# Patient Record
Sex: MALE | Race: WHITE
[De-identification: names, ages, dates, MRNs, and addresses within clinical notes are randomized per-mention and may not be internally consistent; named-entity substitution may affect disease eponyms.]

---

## 2018-08-15 ENCOUNTER — HOSPITAL ENCOUNTER (OUTPATIENT)
Dept: HOSPITAL 47 - RADUSWWP | Age: 73
Discharge: HOME | End: 2018-08-15
Attending: FAMILY MEDICINE
Payer: MEDICARE

## 2018-08-15 DIAGNOSIS — R60.0: Primary | ICD-10-CM

## 2018-08-15 NOTE — US
EXAMINATION TYPE: US venous doppler duplex LE RT

 

DATE OF EXAM: 8/15/2018 7:11 AM

 

COMPARISON: NONE

 

CLINICAL HISTORY: Rt leg edema R60.0. Pt states right leg swelling x 3 weeks/ No known prior DVT

 

SIDE PERFORMED: Right  

 

TECHNIQUE:  The lower extremity deep venous system is examined utilizing real time linear array sonog
jimmy with graded compression, doppler sonography and color-flow sonography.

 

VESSELS IMAGED:

External Iliac Vein (EIV)

Common Femoral Vein

Deep Femoral Vein

Greater Saphenous Vein *

Femoral Vein

Popliteal Vein

Small Saphenous Vein *

Proximal Calf Veins

(* superficial vessels)

 

 

 

Right Leg:  Negative for DVT, probable complex Baker's cyst right pop fossa= 6.7 x 1.3 x 4.7 cm

 

 

 Grayscale, color doppler, spectral doppler imaging performed of the deep veins of the right lower ex
tremity.  There is normal flow, compressibility, vascular waveforms.

Moderate to large size popliteal cyst marked towards end of study.

 

IMPRESSION: No ultrasound evidence for acute DVT in the right lower extremity.

## 2020-08-28 ENCOUNTER — HOSPITAL ENCOUNTER (INPATIENT)
Dept: HOSPITAL 47 - EC | Age: 75
LOS: 2 days | Discharge: HOME | DRG: 853 | End: 2020-08-30
Payer: MEDICARE

## 2020-08-28 DIAGNOSIS — K56.7: ICD-10-CM

## 2020-08-28 DIAGNOSIS — R33.9: ICD-10-CM

## 2020-08-28 DIAGNOSIS — Z98.890: ICD-10-CM

## 2020-08-28 DIAGNOSIS — K40.91: ICD-10-CM

## 2020-08-28 DIAGNOSIS — K35.33: ICD-10-CM

## 2020-08-28 DIAGNOSIS — E87.1: ICD-10-CM

## 2020-08-28 DIAGNOSIS — Z82.49: ICD-10-CM

## 2020-08-28 DIAGNOSIS — J44.9: ICD-10-CM

## 2020-08-28 DIAGNOSIS — Z87.891: ICD-10-CM

## 2020-08-28 DIAGNOSIS — Z88.8: ICD-10-CM

## 2020-08-28 DIAGNOSIS — A41.9: Primary | ICD-10-CM

## 2020-08-28 DIAGNOSIS — Z80.0: ICD-10-CM

## 2020-08-28 DIAGNOSIS — K59.00: ICD-10-CM

## 2020-08-28 LAB
ALBUMIN SERPL-MCNC: 4 G/DL (ref 3.5–5)
ALP SERPL-CCNC: 65 U/L (ref 38–126)
ALT SERPL-CCNC: 17 U/L (ref 4–49)
AMYLASE SERPL-CCNC: 53 U/L (ref 30–110)
ANION GAP SERPL CALC-SCNC: 7 MMOL/L
AST SERPL-CCNC: 26 U/L (ref 17–59)
BASOPHILS # BLD AUTO: 0 K/UL (ref 0–0.2)
BASOPHILS NFR BLD AUTO: 0 %
BUN SERPL-SCNC: 10 MG/DL (ref 9–20)
CALCIUM SPEC-MCNC: 8.8 MG/DL (ref 8.4–10.2)
CHLORIDE SERPL-SCNC: 102 MMOL/L (ref 98–107)
CO2 SERPL-SCNC: 23 MMOL/L (ref 22–30)
EOSINOPHIL # BLD AUTO: 0.3 K/UL (ref 0–0.7)
EOSINOPHIL NFR BLD AUTO: 2 %
ERYTHROCYTE [DISTWIDTH] IN BLOOD BY AUTOMATED COUNT: 5.21 M/UL (ref 4.3–5.9)
ERYTHROCYTE [DISTWIDTH] IN BLOOD: 13.7 % (ref 11.5–15.5)
GLUCOSE SERPL-MCNC: 130 MG/DL (ref 74–99)
HCT VFR BLD AUTO: 46.3 % (ref 39–53)
HGB BLD-MCNC: 15 GM/DL (ref 13–17.5)
HYALINE CASTS UR QL AUTO: 1 /LPF (ref 0–2)
KETONES UR QL STRIP.AUTO: (no result)
LYMPHOCYTES # SPEC AUTO: 0.9 K/UL (ref 1–4.8)
LYMPHOCYTES NFR SPEC AUTO: 6 %
MCH RBC QN AUTO: 28.8 PG (ref 25–35)
MCHC RBC AUTO-ENTMCNC: 32.4 G/DL (ref 31–37)
MCV RBC AUTO: 88.9 FL (ref 80–100)
MONOCYTES # BLD AUTO: 0.7 K/UL (ref 0–1)
MONOCYTES NFR BLD AUTO: 4 %
NEUTROPHILS # BLD AUTO: 14.8 K/UL (ref 1.3–7.7)
NEUTROPHILS NFR BLD AUTO: 88 %
PH UR: 6 [PH] (ref 5–8)
PLATELET # BLD AUTO: 194 K/UL (ref 150–450)
POTASSIUM SERPL-SCNC: 4.2 MMOL/L (ref 3.5–5.1)
PROT SERPL-MCNC: 6.9 G/DL (ref 6.3–8.2)
PROT UR QL: (no result)
RBC UR QL: 3 /HPF (ref 0–5)
SODIUM SERPL-SCNC: 132 MMOL/L (ref 137–145)
SP GR UR: 1.02 (ref 1–1.03)
UROBILINOGEN UR QL STRIP: <2 MG/DL (ref ?–2)
WBC # BLD AUTO: 16.9 K/UL (ref 3.8–10.6)
WBC #/AREA URNS HPF: 1 /HPF (ref 0–5)

## 2020-08-28 PROCEDURE — 96376 TX/PRO/DX INJ SAME DRUG ADON: CPT

## 2020-08-28 PROCEDURE — 93005 ELECTROCARDIOGRAM TRACING: CPT

## 2020-08-28 PROCEDURE — 83690 ASSAY OF LIPASE: CPT

## 2020-08-28 PROCEDURE — 0DTJ4ZZ RESECTION OF APPENDIX, PERCUTANEOUS ENDOSCOPIC APPROACH: ICD-10-PCS

## 2020-08-28 PROCEDURE — 85025 COMPLETE CBC W/AUTO DIFF WBC: CPT

## 2020-08-28 PROCEDURE — 87186 SC STD MICRODIL/AGAR DIL: CPT

## 2020-08-28 PROCEDURE — 8E0W4CZ ROBOTIC ASSISTED PROCEDURE OF TRUNK REGION, PERCUTANEOUS ENDOSCOPIC APPROACH: ICD-10-PCS

## 2020-08-28 PROCEDURE — 74019 RADEX ABDOMEN 2 VIEWS: CPT

## 2020-08-28 PROCEDURE — 3E1M38Z IRRIGATION OF PERITONEAL CAVITY USING IRRIGATING SUBSTANCE, PERCUTANEOUS APPROACH: ICD-10-PCS

## 2020-08-28 PROCEDURE — 88304 TISSUE EXAM BY PATHOLOGIST: CPT

## 2020-08-28 PROCEDURE — 96375 TX/PRO/DX INJ NEW DRUG ADDON: CPT

## 2020-08-28 PROCEDURE — 87205 SMEAR GRAM STAIN: CPT

## 2020-08-28 PROCEDURE — 87077 CULTURE AEROBIC IDENTIFY: CPT

## 2020-08-28 PROCEDURE — 87070 CULTURE OTHR SPECIMN AEROBIC: CPT

## 2020-08-28 PROCEDURE — 99285 EMERGENCY DEPT VISIT HI MDM: CPT

## 2020-08-28 PROCEDURE — 83605 ASSAY OF LACTIC ACID: CPT

## 2020-08-28 PROCEDURE — 82150 ASSAY OF AMYLASE: CPT

## 2020-08-28 PROCEDURE — 87040 BLOOD CULTURE FOR BACTERIA: CPT

## 2020-08-28 PROCEDURE — 96365 THER/PROPH/DIAG IV INF INIT: CPT

## 2020-08-28 PROCEDURE — 71046 X-RAY EXAM CHEST 2 VIEWS: CPT

## 2020-08-28 PROCEDURE — 80053 COMPREHEN METABOLIC PANEL: CPT

## 2020-08-28 PROCEDURE — 74177 CT ABD & PELVIS W/CONTRAST: CPT

## 2020-08-28 PROCEDURE — 36415 COLL VENOUS BLD VENIPUNCTURE: CPT

## 2020-08-28 PROCEDURE — 80048 BASIC METABOLIC PNL TOTAL CA: CPT

## 2020-08-28 PROCEDURE — 96361 HYDRATE IV INFUSION ADD-ON: CPT

## 2020-08-28 PROCEDURE — 87075 CULTR BACTERIA EXCEPT BLOOD: CPT

## 2020-08-28 PROCEDURE — 81001 URINALYSIS AUTO W/SCOPE: CPT

## 2020-08-28 RX ADMIN — NICARDIPINE HYDROCHLORIDE SCH MLS/HR: 2.5 INJECTION INTRAVENOUS at 09:40

## 2020-08-28 RX ADMIN — PIPERACILLIN AND TAZOBACTAM SCH MLS/HR: 3; .375 INJECTION, POWDER, FOR SOLUTION INTRAVENOUS at 20:04

## 2020-08-28 RX ADMIN — KETOROLAC TROMETHAMINE SCH MG: 15 INJECTION, SOLUTION INTRAMUSCULAR; INTRAVENOUS at 11:23

## 2020-08-28 RX ADMIN — KETOROLAC TROMETHAMINE SCH MG: 15 INJECTION, SOLUTION INTRAMUSCULAR; INTRAVENOUS at 23:02

## 2020-08-28 RX ADMIN — ACETAMINOPHEN SCH MG: 500 TABLET ORAL at 23:02

## 2020-08-28 RX ADMIN — ACETAMINOPHEN SCH: 500 TABLET ORAL at 11:17

## 2020-08-28 RX ADMIN — ACETAMINOPHEN SCH MG: 500 TABLET ORAL at 09:06

## 2020-08-28 RX ADMIN — ACETAMINOPHEN SCH: 500 TABLET ORAL at 18:11

## 2020-08-28 RX ADMIN — KETOROLAC TROMETHAMINE SCH: 15 INJECTION, SOLUTION INTRAMUSCULAR; INTRAVENOUS at 19:49

## 2020-08-28 RX ADMIN — PANTOPRAZOLE SODIUM SCH MG: 40 INJECTION, POWDER, FOR SOLUTION INTRAVENOUS at 09:07

## 2020-08-28 RX ADMIN — HEPARIN SODIUM SCH UNIT: 5000 INJECTION, SOLUTION INTRAVENOUS; SUBCUTANEOUS at 09:07

## 2020-08-28 RX ADMIN — HEPARIN SODIUM SCH UNIT: 5000 INJECTION, SOLUTION INTRAVENOUS; SUBCUTANEOUS at 20:04

## 2020-08-28 NOTE — XR
EXAMINATION TYPE: XR chest 2V

 

DATE OF EXAM: 8/28/2020

 

COMPARISON: NONE

 

HISTORY: Shortness of breath

 

TECHNIQUE:  Frontal and lateral views of the chest are obtained.

 

FINDINGS:

 

Scattered senescent parenchymal changes noted. Hyperinflation compatible with COPD. 

 

No evidence for infiltrate. No evidence for atelectasis.

 

Heart size is stable.

 

Mediastinal structures are stable and grossly unremarkable.

 

No evidence for hilar prominence.

 

Degenerative changes dorsal spine. 

 

IMPRESSION:

1. No evidence for acute pulmonary disease.

## 2020-08-28 NOTE — ED
Abdominal Pain HPI





- General


Chief Complaint: Abdominal Pain


Stated Complaint: Abdominal pain


Time Seen by Provider: 08/28/20 03:53


Source: patient, family, RN notes reviewed, old records reviewed


Mode of arrival: ambulatory


Limitations: no limitations





- History of Present Illness


Initial Comments: 





This is a 74-year-old male DF for evaluation patient Dese for 3 days of 

constipation has history of hernia repair.  Patient's pain is here no fevers.  

He denies any and nausea no vomiting.  Appetite is been diminished.  Patient has

persistent abdominal pain with persistent constipation unable to have 

significant bowel movement again 3 days.  Pain is severe mainly located right 

lower quadrant believes he may have eaten that


MD Complaint: abdominal pain (Right lower quadrant)


-: days(s) (3)


Location: RLQ


Radiation: RLQ


Migration to: RLQ


Severity: severe


Severity scale (1-10): 10


Quality: stabbing, aching


Consistency: constant


Improves With: nothing


Worsens With: nothing


Associated Symptoms: nausea, constipation





- Related Data


                                    Allergies











Allergy/AdvReac Type Severity Reaction Status Date / Time


 


prednisone Allergy  Unknown Verified 08/28/20 03:51














Review of Systems


ROS Statement: 


Those systems with pertinent positive or pertinent negative responses have been 

documented in the HPI.





ROS Other: All systems not noted in ROS Statement are negative.





Past Medical History


Past Medical History: No Reported History


History of Any Multi-Drug Resistant Organisms: None Reported


Past Surgical History: Hernia Repair


Smoking Status: Former smoker


Past Alcohol Use History: Occasional


Past Drug Use History: None Reported





General Exam


Limitations: no limitations


General appearance: alert, in no apparent distress


Head exam: Present: atraumatic, normocephalic, normal inspection


Eye exam: Present: normal appearance, PERRL, EOMI.  Absent: scleral icterus, 

conjunctival injection, periorbital swelling


ENT exam: Present: normal exam, mucous membranes moist


Neck exam: Present: normal inspection.  Absent: tenderness, meningismus, 

lymphadenopathy


Respiratory exam: Present: normal lung sounds bilaterally.  Absent: respiratory 

distress, wheezes, rales, rhonchi, stridor


Cardiovascular Exam: Present: regular rate, normal rhythm, normal heart sounds. 

Absent: systolic murmur, diastolic murmur, rubs, gallop, clicks


GI/Abdominal exam: Present: distended (Right lower quadrant), tenderness, 

guarding, normal bowel sounds.  Absent: soft, rebound, rigid


Extremities exam: Present: normal inspection, full ROM, normal capillary refill.

 Absent: tenderness, pedal edema, joint swelling, calf tenderness


Back exam: Present: normal inspection


Neurological exam: Present: alert, oriented X3, CN II-XII intact


Psychiatric exam: Present: normal affect, normal mood


Skin exam: Present: warm, dry, intact, normal color.  Absent: rash





Course


                                   Vital Signs











  08/28/20 08/28/20





  03:47 05:52


 


Temperature 99.2 F 


 


Pulse Rate 94 76


 


Respiratory 20 20





Rate  


 


Blood Pressure 145/84 141/66


 


O2 Sat by Pulse 97 95





Oximetry  














- Reevaluation(s)


Reevaluation #1: 





08/28/20 06:21


Medical records reviewed


Reevaluation #2: 





08/28/20 06:22


Patient has good pain control


Reevaluation #3: 





08/28/20 06:22


Spoke with patient and wife regarding symptoms, and findings questions answered





- Consultations


Consultation #1: 





Spoke with Dr. Bennett will keep patient in the operating room before or





Medical Decision Making





- Medical Decision Making





34 male DF for evaluation of severe abdominal pain.  Patient will be admitted to

the operating room for surgical evaluation and treatment





- Lab Data


Result diagrams: 


                                 08/28/20 04:31





                                 08/28/20 04:31


                                   Lab Results











  08/28/20 08/28/20 08/28/20 Range/Units





  04:31 04:31 04:31 


 


WBC  16.9 H    (3.8-10.6)  k/uL


 


RBC  5.21    (4.30-5.90)  m/uL


 


Hgb  15.0    (13.0-17.5)  gm/dL


 


Hct  46.3    (39.0-53.0)  %


 


MCV  88.9    (80.0-100.0)  fL


 


MCH  28.8    (25.0-35.0)  pg


 


MCHC  32.4    (31.0-37.0)  g/dL


 


RDW  13.7    (11.5-15.5)  %


 


Plt Count  194    (150-450)  k/uL


 


Neutrophils %  88    %


 


Lymphocytes %  6    %


 


Monocytes %  4    %


 


Eosinophils %  2    %


 


Basophils %  0    %


 


Neutrophils #  14.8 H    (1.3-7.7)  k/uL


 


Lymphocytes #  0.9 L    (1.0-4.8)  k/uL


 


Monocytes #  0.7    (0-1.0)  k/uL


 


Eosinophils #  0.3    (0-0.7)  k/uL


 


Basophils #  0.0    (0-0.2)  k/uL


 


Sodium    132 L  (137-145)  mmol/L


 


Potassium    4.2  (3.5-5.1)  mmol/L


 


Chloride    102  ()  mmol/L


 


Carbon Dioxide    23  (22-30)  mmol/L


 


Anion Gap    7  mmol/L


 


BUN    10  (9-20)  mg/dL


 


Creatinine    0.85  (0.66-1.25)  mg/dL


 


Est GFR (CKD-EPI)AfAm    >90  (>60 ml/min/1.73 sqM)  


 


Est GFR (CKD-EPI)NonAf    85  (>60 ml/min/1.73 sqM)  


 


Glucose    130 H  (74-99)  mg/dL


 


Plasma Lactic Acid Christopher     (0.7-2.0)  mmol/L


 


Calcium    8.8  (8.4-10.2)  mg/dL


 


Total Bilirubin    1.5 H  (0.2-1.3)  mg/dL


 


AST    26  (17-59)  U/L


 


ALT    17  (4-49)  U/L


 


Alkaline Phosphatase    65  ()  U/L


 


Total Protein    6.9  (6.3-8.2)  g/dL


 


Albumin    4.0  (3.5-5.0)  g/dL


 


Amylase    53  ()  U/L


 


Lipase    55  ()  U/L


 


Urine Color   Yellow   


 


Urine Appearance   Clear   (Clear)  


 


Urine pH   6.0   (5.0-8.0)  


 


Ur Specific Gravity   1.016   (1.001-1.035)  


 


Urine Protein   1+ H   (Negative)  


 


Urine Glucose (UA)   Negative   (Negative)  


 


Urine Ketones   2+ H   (Negative)  


 


Urine Blood   Trace H   (Negative)  


 


Urine Nitrite   Negative   (Negative)  


 


Urine Bilirubin   Negative   (Negative)  


 


Urine Urobilinogen   <2.0   (<2.0)  mg/dL


 


Ur Leukocyte Esterase   Negative   (Negative)  


 


Urine RBC   3   (0-5)  /hpf


 


Urine WBC   1   (0-5)  /hpf


 


Hyaline Casts   1   (0-2)  /lpf


 


Urine Mucus   Few H   (None)  /hpf














  08/28/20 Range/Units





  04:31 


 


WBC   (3.8-10.6)  k/uL


 


RBC   (4.30-5.90)  m/uL


 


Hgb   (13.0-17.5)  gm/dL


 


Hct   (39.0-53.0)  %


 


MCV   (80.0-100.0)  fL


 


MCH   (25.0-35.0)  pg


 


MCHC   (31.0-37.0)  g/dL


 


RDW   (11.5-15.5)  %


 


Plt Count   (150-450)  k/uL


 


Neutrophils %   %


 


Lymphocytes %   %


 


Monocytes %   %


 


Eosinophils %   %


 


Basophils %   %


 


Neutrophils #   (1.3-7.7)  k/uL


 


Lymphocytes #   (1.0-4.8)  k/uL


 


Monocytes #   (0-1.0)  k/uL


 


Eosinophils #   (0-0.7)  k/uL


 


Basophils #   (0-0.2)  k/uL


 


Sodium   (137-145)  mmol/L


 


Potassium   (3.5-5.1)  mmol/L


 


Chloride   ()  mmol/L


 


Carbon Dioxide   (22-30)  mmol/L


 


Anion Gap   mmol/L


 


BUN   (9-20)  mg/dL


 


Creatinine   (0.66-1.25)  mg/dL


 


Est GFR (CKD-EPI)AfAm   (>60 ml/min/1.73 sqM)  


 


Est GFR (CKD-EPI)NonAf   (>60 ml/min/1.73 sqM)  


 


Glucose   (74-99)  mg/dL


 


Plasma Lactic Acid Christopher  0.8  (0.7-2.0)  mmol/L


 


Calcium   (8.4-10.2)  mg/dL


 


Total Bilirubin   (0.2-1.3)  mg/dL


 


AST   (17-59)  U/L


 


ALT   (4-49)  U/L


 


Alkaline Phosphatase   ()  U/L


 


Total Protein   (6.3-8.2)  g/dL


 


Albumin   (3.5-5.0)  g/dL


 


Amylase   ()  U/L


 


Lipase   ()  U/L


 


Urine Color   


 


Urine Appearance   (Clear)  


 


Urine pH   (5.0-8.0)  


 


Ur Specific Gravity   (1.001-1.035)  


 


Urine Protein   (Negative)  


 


Urine Glucose (UA)   (Negative)  


 


Urine Ketones   (Negative)  


 


Urine Blood   (Negative)  


 


Urine Nitrite   (Negative)  


 


Urine Bilirubin   (Negative)  


 


Urine Urobilinogen   (<2.0)  mg/dL


 


Ur Leukocyte Esterase   (Negative)  


 


Urine RBC   (0-5)  /hpf


 


Urine WBC   (0-5)  /hpf


 


Hyaline Casts   (0-2)  /lpf


 


Urine Mucus   (None)  /hpf














- Radiology Data


Radiology results: report reviewed (CT abdomen and pelvis positive for acute 

appendicitis), image reviewed





Disposition


Clinical Impression: 


 Abdominal pain, Acute appendicitis, Acute abdomen





Disposition: ADMITTED AS IP TO THIS HOSP


Condition: Serious


Is patient prescribed a controlled substance at d/c from ED?: No

## 2020-08-28 NOTE — CT
EXAMINATION TYPE: CT abdomen pelvis w con

 

DATE OF EXAM: 8/28/2020

 

COMPARISON: None

 

HISTORY: connstipation x 3days

 

CT DLP: 1022.6 mGycm

Automated exposure control for dose reduction was used.

 

CONTRAST: 

Performed with IV Contrast, patient injected with 100 mL of Isovue 300.

There is some mild atelectasis at the lung bases. There is no pleural effusion. Heart is slightly enl
arged.

 

There are cysts in the superior right lobe of the liver that measure up to 3 cm. Spleen is intact. Th
ere is small hiatal hernia. Stomach is intact. There is no pancreatic mass. Gallbladder appears bright
l. Bile ducts are not dilated.

 

There is no adrenal mass. Kidneys show satisfactory contrast opacification. There is no hydronephrosi
s. The ureters are not dilated. There is no retroperitoneal adenopathy. Bladder distends smoothly. Th
ere is right-sided inguinal hernia that contains fat.

There is small amount of fluid in the pelvis on the right side.

There is fat stranding in the right lower quadrant. There is dilated fluid-filled appendix. Appendix 
measures up to 12 mm. There is appendicolith.

 

There is no evidence of a bowel obstruction. Small bowel is not dilated. There is no evidence of cons
tipation. There is prostatic calcification. The lumbar vertebra have normal alignment. There is no co
mpression fracture. Posterior elements are intact. Bony pelvis is intact. There is some spurring in t
he lumbar spine.

 

IMPRESSION:

Moderate fat stranding around the dilated fluid-filled appendix. Appendicitis. Appendicolith. There i
s tiny amount of free fluid in the pelvis and appendiceal rupture is suspected.

## 2020-08-28 NOTE — P.CONS
History of Present Illness





- Reason for Consult


Consult date: 20


Medical management





- Chief Complaint


Right lower quadrant pain





- History of Present Illness





This is a 74-year-old male with Dr.Tom Archuleta who denies any major 

significant medical history, has been dealing with constipation for years, and 

has not been to his primary care in over a couple years.  Currently does not 

take any medications.  Surgical history is positive for hernia repair in .  

He presented to the emergency room with a 2 day history of right lower quadrant 

abdominal pain that is sharp and persistent in nature.  He believed it was his 

constipation after not improving decided to come to the emergency room.  He 

described acute onset of pain 10 out of 10.  Denies any shortness of breath or 

chest pain.  CT of the abdomen revealed dilated fluid-filled appendix, 

appendicitis with a tiny mole of free fluid in the pelvis and appendiceal 

rupture is suspected.  Patient was admitted to observation under Dr. Momin 

plan to go for surgery today.  EKG was done and normal sinus rhythm. CXR showed 

no acute process,  patient currently on Unasyn and Zosyn IV.  Blood cultures 

were ordered. Morphine IV ordered for pain control.  Vital signs are stable, 

temp was 103.1 now down to 99.5, pulse 89, blood pressure 126/84, pulse ox 95% 

on room air





Review of Systems








General: Presentl fever, chills,nausea, or vomiting.


HEENT: No visual changes. No eye pain. No nasal symptoms. No dysphagia.No 

odynophagia. No ENT pain.


Cardiac: No chest pain. No palpitations. 


Pulmonary: No dyspnea.


GI: Positive right lower quadrant abdominal pain. No diarrhea.  Positive 

constipation. No bowel habit changes. No melena. No hematochezia. 


: No dysuria.No hematuria. No hesitancy. No urgency. 


Musculoskeletal: No musculoskeletal pain.


Integumentary: Denies rash. Denies pruritis.


Neurologic: Denies any lateralizing weakness. Denies headache. Denies numbness. 

Denies tingling. No seizure activity. Denies TIA or CVA.


Endocrine: Denies Fatigue 


Heme/Onc: Denies anemia. Denies cancer. Denies adenopathy.


Allergic/Immunologic: Denies allergies 





Past Medical History


Additional Past Medical History / Comment(s): Constipation


History of Any Multi-Drug Resistant Organisms: None Reported


Past Surgical History: Hernia Repair (Hernia repair )


Past Psychological History: No Psychological Hx Reported


Smoking Status: Former smoker


Past Alcohol Use History: Occasional


Past Drug Use History: None Reported





- Past Family History


  ** Mother


History Unknown: Yes


Family Medical History: Cancer (Gallbladder cancer  at 72)





  ** Father


History Unknown: Yes (He ceased from plane accident at age 67)


Family Medical History: Hypertension





  ** Brother(s)


Family Medical History: No Reported History (One brother alive and well other 

brother  in MVA)





Medications and Allergies


                                Home Medications











 Medication  Instructions  Recorded  Confirmed  Type


 


No Known Home Medications  20 History








                                    Allergies











Allergy/AdvReac Type Severity Reaction Status Date / Time


 


prednisone Allergy  Unknown Verified 20 06:29














Physical Exam


Vitals: 


                                   Vital Signs











  Temp Pulse Pulse Resp BP BP Pulse Ox


 


 20 10:45  99.5 F   89  18   126/84  95


 


 20 08:53    105 H  18   


 


 20 08:28  103.1 F H   105 H  18   160/75  94 L


 


 20 08:03  102.4 F H  94   16  145/72   94 L


 


 20 06:26  99.7 F H      


 


 20 05:52   76   20  141/66   95


 


 20 03:47  99.2 F  94   20  145/84   97








                                Intake and Output











 20





 22:59 06:59 14:59


 


Output Total   1


 


Balance   -1


 


Output:   


 


  Urine   1


 


Other:   


 


  # Voids   1


 


  Weight  78.018 kg 78.018 kg














General: Patient awake alert and oriented x 3.  No acute distress.


HEENT: Sclerae are clear. Pupils equal, round and reactive to light bilaterally.

 No cervical adenopathy.  No pharyngeal erythema or exudate. No thyromegaly.


Lymphatic: No anterior cervical adenopathy.


Chest: Heart regular in rate and rhythm positive S1 and S2.  No S3. No S4.  No 

clicks, rubs or murmurs.


Lungs: Clear to auscultation bilaterally.  No wheezes rales or rhonchi. 

Respirations even and nonlabored.


Abdomen/GI: Bowel sounds present in all 4 quadrants. Bowel sounds hypoactive.  

Positive tenderness right lower quadrant.  No mass.  No hepatomegaly or splenome

mathew. No bruits. 


Musculoskeletal/ Extremities: No tenderness on muscular exam.  No ecchymosis.


Vascular: Radial pulses equal. 2/4.


Skin: No rash.


Neurologic: Awake, alert and oriented times 3. No lateralizing deficits noted on

gross inspection.


Psychiatric: Appropriate affect





Results


CBC & Chem 7: 


                                 20 04:31





                                 20 04:31


Labs: 


                  Abnormal Lab Results - Last 24 Hours (Table)











  20 Range/Units





  04:31 04:31 04:31 


 


WBC  16.9 H    (3.8-10.6)  k/uL


 


Neutrophils #  14.8 H    (1.3-7.7)  k/uL


 


Lymphocytes #  0.9 L    (1.0-4.8)  k/uL


 


Sodium    132 L  (137-145)  mmol/L


 


Glucose    130 H  (74-99)  mg/dL


 


Total Bilirubin    1.5 H  (0.2-1.3)  mg/dL


 


Urine Protein   1+ H   (Negative)  


 


Urine Ketones   2+ H   (Negative)  


 


Urine Blood   Trace H   (Negative)  


 


Urine Mucus   Few H   (None)  /hpf














Assessment and Plan


Assessment: 





1.  Acute Appendicitis with sepsis: Plan for patient to have appendectomy with 

Dr. Momin today.





2.  Leukocytosis: White blood cells over 16,000, likely related to ruptured 

appendicitis.  Patient on IV Unasyn and Zosyn.  Blood cultures are pending.





3.  Right lower quadrant pain: Morphine ordered as needed





4.  Hyponatremia: IV fluids and place.





5.  DVT prophylaxis: on heparin subcu along with sequential compression devices 

and DARIO hose.





6.  GI prophylaxis: On Protonix 40 mg IV





Patient will be admitted to the hospital for minimum of 2 night stay.





Discharge plan: Home with self-care





Impression and plan of care have been directed as dictated by the signing 

physician.  Ricarda Boss nurse practitioner acting as scribe for signing 

physician.

## 2020-08-28 NOTE — P.GSHP
History of Present Illness


H&P Date: 08/28/20

















CHIEF COMPLAINT: 


Right lower quadrant abdominal pain with appendicitis over 2 days  





HISTORY OF PRESENT ILLNESS:  


The patient is a 74-year-old male who presents with over 2 day history of right 

lower quadrant abdominal pain that is sharp and persistent in nature.  He 

reports eating bad broccoli in his refrigerator that has been old beyond 1-2 

days and thought he had food poisoning.  Denies any previous abdominal pain with

the exception of constipation.  Reports a lifelong history of shortness of 

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  He does not 

see a pulmonologist.  Denies any pre-existing heart disease or chest pain.  He 

rarely sees his regular doctor.  Last time he saw his regular doctor was 2 years

ago.  He confirms prior abdominal surgery including umbilical and bilateral 

inguinal hernia repairs.  Last colonoscopy was over 5 years.  At time of asse

ssment, patient's temperature was 100.3.  Patient presents with CT positive 

appendicitis.





PAST MEDICAL HISTORY:  


See list and reviewed





PAST SURGICAL HISTORY: 


See list and reviewed





CURRENT MEDICATIONS:  


See list and reviewed





ALLERGIES:  


See list and reviewed





SOCIAL HISTORY:


See list and reviewed





FAMILY HISTORY:


See list and reviewed





REVIEW OF ORGAN SYSTEMS:


CONSTITUTIONAL: Present fever, no chills.  Denies recent weight loss. 


HEENT:  Denies any trouble with vision, hearing or nosebleeds.  No difficulty 

swallowing. 


LYMPHATIC:  The patient denies any lumps and bumps around the neck. 


ENDOCRINE:  Denies any thyroid disorders. Denies any blood sugar glucose 

intolerance.


RESPIRATORY:  Has shortness of breath.  Denies chronic cough.


CARDIOVASCULAR:  Denies history of chest pain with exertion.  


GASTROINTESTINAL:  Has intermittent constipation.  Last colonoscopy over 2 years

ago.


GENITOURINARY:  Denies any blood in urine or increased urinary frequency. 


MUSCULOSKELETAL:  Denies current joint arthritis.   


NEUROLOGIC:  Denies any numbness or tingling along the distal extremities. No 

seizure disorders or headaches.  


PSYCHIATRIC:  Denies any depression or suicidal ideation.


HEMATOLOGIC:  Denies any abnormal bleeding or bruising.       





PHYSICAL EXAMINATION:  


VITALS: Reviewed


GENERAL: A 74-year-old male in no acute distress. Pleasant.


HEENT:  No sclera icterus. Extraocular movements grossly intact.  Moist buccal 

mucosa. Head is atraumatic, normocephalic. Hears conversational speech. No nasal

drainage.


NECK:  Supple without lymphadenopathy. No JV distention.


CHEST:  Non-labored respirations and equal bilateral excursions. 


CARDIOVASCULAR:  Regular rate and rhythm.  Palpable 2+ radial pulses.


ABDOMEN:  Soft, tender at the right lower quadrant.


MUSCULOSKELETAL:  No clubbing, cyanosis or edema.


NEUROLOGIC:  No focal or lateralizing signs. 


PSYCH:  Appropriate affect.  Alert and oriented to person, place and time.


SKIN: Well perfused.  Good skin turgor.





LABS: Reviewed.  White blood cell count elevated over 16,000.





STUDIES: CT of the abdomen and pelvis  independently reviewed demonstrating 

inflammatory changes along the entire appendix with fluid and small gas bubble 

highly suspicious for perforated appendicitis.  This might independent 

interpretation.








ASSESSMENT:


1.   Right lower quadrant pain.


2.   Appendicitis with sepsis


3.   Leukocytosis.


4.   Lack of general medical care


5.   Dyspnea on exertion








PLAN:


1.   Recommend 12-lead EKG. Should he have abnormalities will need full cardiac 

risk assessment. 


2.   He reports pre-existing dyspnea and exertion over 20 years without any 

prior assessment.  Recommend two-view chest x-ray for evaluation of COPD 


3.   Patient has sepsis on presentation with elevated temperature, white count 

over 16,000, source of infection of potential ruptured appendicitis.  Inpatient 

hospitalization beyond 3 days described.  


4.   Incentive spirometer for pulmonary toilet 


5.   Patient's elevated risk for complications due to sepsis as well as pre-

existing COPD 


6.   I have discussed benefits and risks of robotic appendectomy.


7.   Bilateral SCDs and heparin for DVT prophylaxis 


8.   Antibiotics intravenous to address moderate leukocytosis with underlying 

sepsis


9.   Scheduled Tylenol and Toradol for pain medication including fevers





   Thank you very much for allowing me to participate in the care of your 

patient.





Past Medical History


Past Medical History: No Reported History


History of Any Multi-Drug Resistant Organisms: None Reported


Past Surgical History: Hernia Repair


Past Psychological History: No Psychological Hx Reported


Smoking Status: Former smoker


Past Alcohol Use History: Occasional


Past Drug Use History: None Reported





- Past Family History


  ** Mother


History Unknown: Yes


Family Medical History: Cancer





  ** Father


History Unknown: Yes


Family Medical History: Hypertension





Medications and Allergies


                                Home Medications











 Medication  Instructions  Recorded  Confirmed  Type


 


No Known Home Medications  08/28/20 08/28/20 History








                                    Allergies











Allergy/AdvReac Type Severity Reaction Status Date / Time


 


prednisone Allergy  Unknown Verified 08/28/20 06:29














Surgical - Exam


                                   Vital Signs











Temp Pulse Resp BP Pulse Ox


 


 99.2 F   94   20   145/84   97 


 


 08/28/20 03:47  08/28/20 03:47  08/28/20 03:47  08/28/20 03:47  08/28/20 03:47














Results





- Labs





                                 08/28/20 04:31





                                 08/28/20 04:31


                  Abnormal Lab Results - Last 24 Hours (Table)











  08/28/20 08/28/20 08/28/20 Range/Units





  04:31 04:31 04:31 


 


WBC  16.9 H    (3.8-10.6)  k/uL


 


Neutrophils #  14.8 H    (1.3-7.7)  k/uL


 


Lymphocytes #  0.9 L    (1.0-4.8)  k/uL


 


Sodium    132 L  (137-145)  mmol/L


 


Glucose    130 H  (74-99)  mg/dL


 


Total Bilirubin    1.5 H  (0.2-1.3)  mg/dL


 


Urine Protein   1+ H   (Negative)  


 


Urine Ketones   2+ H   (Negative)  


 


Urine Blood   Trace H   (Negative)  


 


Urine Mucus   Few H   (None)  /hpf








                                 Diabetes panel











  08/28/20 Range/Units





  04:31 


 


Sodium  132 L  (137-145)  mmol/L


 


Potassium  4.2  (3.5-5.1)  mmol/L


 


Chloride  102  ()  mmol/L


 


Carbon Dioxide  23  (22-30)  mmol/L


 


BUN  10  (9-20)  mg/dL


 


Creatinine  0.85  (0.66-1.25)  mg/dL


 


Glucose  130 H  (74-99)  mg/dL


 


Calcium  8.8  (8.4-10.2)  mg/dL


 


AST  26  (17-59)  U/L


 


ALT  17  (4-49)  U/L


 


Alkaline Phosphatase  65  ()  U/L


 


Total Protein  6.9  (6.3-8.2)  g/dL


 


Albumin  4.0  (3.5-5.0)  g/dL








                                  Calcium panel











  08/28/20 Range/Units





  04:31 


 


Calcium  8.8  (8.4-10.2)  mg/dL


 


Albumin  4.0  (3.5-5.0)  g/dL








                                 Pituitary panel











  08/28/20 Range/Units





  04:31 


 


Sodium  132 L  (137-145)  mmol/L


 


Potassium  4.2  (3.5-5.1)  mmol/L


 


Chloride  102  ()  mmol/L


 


Carbon Dioxide  23  (22-30)  mmol/L


 


BUN  10  (9-20)  mg/dL


 


Creatinine  0.85  (0.66-1.25)  mg/dL


 


Glucose  130 H  (74-99)  mg/dL


 


Calcium  8.8  (8.4-10.2)  mg/dL








                                  Adrenal panel











  08/28/20 Range/Units





  04:31 


 


Sodium  132 L  (137-145)  mmol/L


 


Potassium  4.2  (3.5-5.1)  mmol/L


 


Chloride  102  ()  mmol/L


 


Carbon Dioxide  23  (22-30)  mmol/L


 


BUN  10  (9-20)  mg/dL


 


Creatinine  0.85  (0.66-1.25)  mg/dL


 


Glucose  130 H  (74-99)  mg/dL


 


Calcium  8.8  (8.4-10.2)  mg/dL


 


Total Bilirubin  1.5 H  (0.2-1.3)  mg/dL


 


AST  26  (17-59)  U/L


 


ALT  17  (4-49)  U/L


 


Alkaline Phosphatase  65  ()  U/L


 


Total Protein  6.9  (6.3-8.2)  g/dL


 


Albumin  4.0  (3.5-5.0)  g/dL














Assessment and Plan


(1) COPD (chronic obstructive pulmonary disease)


Current Visit: Yes   Status: Acute   Code(s): J44.9 - CHRONIC OBSTRUCTIVE 

PULMONARY DISEASE, UNSPECIFIED   SNOMED Code(s): 27503587


   





(2) Sepsis


Current Visit: Yes   Status: Acute   Code(s): A41.9 - SEPSIS, UNSPECIFIED 

ORGANISM   SNOMED Code(s): 55322652


   





(3) Acute appendicitis


Current Visit: Yes   Status: Acute   Code(s): K35.80 - UNSPECIFIED ACUTE 

APPENDICITIS   SNOMED Code(s): 83920638

## 2020-08-28 NOTE — P.OP
Date of Procedure: 08/28/20


Description of Procedure: 











SURGEON:  PAULA FINCH MD





Preoperative Diagnosis: 


1.  Acute appendicitis with sepsis


2.  Chronic obstructive pulmonary disease





Postoperative Diagnosis: 


1.  Ruptured acute appendicitis with sepsis, localized peritonitis, abscess


2.  Chronic obstructive pulmonary disease


3.  Recurrent right inguinal hernia





Procedure(s) Performed: 


1. Robotic-assisted daVinci Xi laparoscopic appendectomy


2. Peritoneal lavage 1000 mL normal saline





Anesthesia: GETA, local


Surgeon: Paula Finch


Estimated Blood Loss (ml): 5


Pathology: other (appendix, aerobic and anerobic culture of peritoneal fluid 

from peritonitis)


Condition: stable


Disposition: floor





Operative Findings: 


1. Localized abscess over 25-mL drained right lower quadrant


2. Gangrenous ruptured purulent appendicitis at body of the appendix with base 

unremarkable


3. Abdomen irrigated with 500-mL normal saline


4.  Staple line hemostatic


5.  Recurrent indirect inguinal hernia, right, obturator hernia





INDICATIONS: The patient is a 74-year-old female who presents with acute 

appendicitis including fevers and sepsis.  Surgical intervention was described 

in detail.  Benefits and risks, including infection, open surgery, and 

possibility for additional surgery was discussed at length. Informed consent was

obtained. All questions of the patient and family were answered.





DESCRIPTION:  The patient was transferred to the operating room and placed in 

supine position.  The patient had previously voided.  The abdomen was then 

prepped and draped in standard sterile fashion as Ioban was placed along the 

abdomen to minimize any contamination of skin floor. 





After a timeout protocol was performed, attention was then brought to the left 

upper quadrant whereby a 0 degree 5 mm laparoscopic trocar entry was performed. 

The abdominal cavity was entered and insufflated to 15 mmHg pressure, which was 

tolerated well. Diagnostic laparoscopy demonstrated no injury to bowel, viscera 

or mesentery.  Adhesions were confirmed of the right lower quadrant of omentum, 

small bowel to the abdominal wall. Localized abscess was found.





Next a robotic 12-mm trocar was placed along the left upper quadrant upper 

quadrant. A 8 mm port was placed along the left lower quadrant and another 8-mm 

port along the left lateral abdominal wall.  Ports were placed 10 cm apart from 

each other including 15-20 cm away from the target anatomy of the right pelvis. 

The patient was then placed in Trendelenburg position, at least 16 and right 

side up at least 6.





The robotic da Deborah XI system was primed and docked from the left side of the 

patient. 





Using atraumatic graspers and vessel sealer, the robotic system was docked and 

primed as described. Instruments were interchanged by the assistant including 

graspers, robotic stapler and vessel sealer. 





Next, attention was brought to identify the cecum.  





A systematic view within the abdominal cavity was started with the small bowel 

which was remarkable for diffuse fibrinous exudate along the right upper 

quadrant.  The base of the cecum was without inflammation.  The appendix was 

ruptured near at the body with moderate dissection performed.  An abscess of 25-

mL was aspirated from the abdomen. 





A 45 mm blue robotic staple loads were fired along the base of the appendix.  

The staple line was hemostatic.  Hemostasis was checked prior to undocking the 

robot.  The  abdomen was irrigated with at least 1000 mL normal saline.





The robot was undocked.  I re-scrubbed into the case.  





The specimen was removed from the abdominal cavity with an Endo Catch bag 

through the 12 mm trocar at the left upper quadrant. All instruments and 

pneumoperitoneum were evacuated from the abdominal cavity.  Ramin Cosme 0 

Vicryl was used to close the defect of the left upper quadrant.





Local anesthetic was infiltrated to all wounds for postop analgesia.  All 

incisions were also cleansed with diluted hydrogen peroxide.  Exofin glue was 

applied to the rest of the skin incisions. 





The patient had tolerated the procedure well. The patient was extubated 

successfully. The patient was transferred to the postanesthesia care unit in 

stable condition.

## 2020-08-29 LAB
ANION GAP SERPL CALC-SCNC: 5 MMOL/L
BASOPHILS # BLD AUTO: 0 K/UL (ref 0–0.2)
BASOPHILS NFR BLD AUTO: 0 %
BUN SERPL-SCNC: 17 MG/DL (ref 9–20)
CALCIUM SPEC-MCNC: 6.7 MG/DL (ref 8.4–10.2)
CHLORIDE SERPL-SCNC: 109 MMOL/L (ref 98–107)
CO2 SERPL-SCNC: 21 MMOL/L (ref 22–30)
EOSINOPHIL # BLD AUTO: 0.1 K/UL (ref 0–0.7)
EOSINOPHIL NFR BLD AUTO: 1 %
ERYTHROCYTE [DISTWIDTH] IN BLOOD BY AUTOMATED COUNT: 4.16 M/UL (ref 4.3–5.9)
ERYTHROCYTE [DISTWIDTH] IN BLOOD: 14 % (ref 11.5–15.5)
GLUCOSE SERPL-MCNC: 98 MG/DL (ref 74–99)
HCT VFR BLD AUTO: 37.8 % (ref 39–53)
HGB BLD-MCNC: 12.1 GM/DL (ref 13–17.5)
LYMPHOCYTES # SPEC AUTO: 0.8 K/UL (ref 1–4.8)
LYMPHOCYTES NFR SPEC AUTO: 8 %
MCH RBC QN AUTO: 29 PG (ref 25–35)
MCHC RBC AUTO-ENTMCNC: 31.9 G/DL (ref 31–37)
MCV RBC AUTO: 90.9 FL (ref 80–100)
MONOCYTES # BLD AUTO: 0.3 K/UL (ref 0–1)
MONOCYTES NFR BLD AUTO: 3 %
NEUTROPHILS # BLD AUTO: 8.7 K/UL (ref 1.3–7.7)
NEUTROPHILS NFR BLD AUTO: 86 %
PLATELET # BLD AUTO: 140 K/UL (ref 150–450)
POTASSIUM SERPL-SCNC: 3.9 MMOL/L (ref 3.5–5.1)
SODIUM SERPL-SCNC: 135 MMOL/L (ref 137–145)
WBC # BLD AUTO: 10 K/UL (ref 3.8–10.6)

## 2020-08-29 RX ADMIN — ACETAMINOPHEN SCH MG: 500 TABLET ORAL at 23:16

## 2020-08-29 RX ADMIN — DIMETHICONE SCH: 80 TABLET, CHEWABLE ORAL at 17:15

## 2020-08-29 RX ADMIN — KETOROLAC TROMETHAMINE SCH MG: 15 INJECTION, SOLUTION INTRAMUSCULAR; INTRAVENOUS at 17:12

## 2020-08-29 RX ADMIN — DIMETHICONE SCH MG: 80 TABLET, CHEWABLE ORAL at 21:11

## 2020-08-29 RX ADMIN — DIMETHICONE SCH MG: 80 TABLET, CHEWABLE ORAL at 16:05

## 2020-08-29 RX ADMIN — ACETAMINOPHEN SCH MG: 500 TABLET ORAL at 04:58

## 2020-08-29 RX ADMIN — HEPARIN SODIUM SCH UNIT: 5000 INJECTION, SOLUTION INTRAVENOUS; SUBCUTANEOUS at 07:17

## 2020-08-29 RX ADMIN — TAMSULOSIN HYDROCHLORIDE SCH MG: 0.4 CAPSULE ORAL at 07:17

## 2020-08-29 RX ADMIN — ACETAMINOPHEN SCH MG: 500 TABLET ORAL at 17:11

## 2020-08-29 RX ADMIN — PIPERACILLIN AND TAZOBACTAM SCH MLS/HR: 3; .375 INJECTION, POWDER, FOR SOLUTION INTRAVENOUS at 17:12

## 2020-08-29 RX ADMIN — ACETAMINOPHEN SCH MG: 500 TABLET ORAL at 11:44

## 2020-08-29 RX ADMIN — PIPERACILLIN AND TAZOBACTAM SCH MLS/HR: 3; .375 INJECTION, POWDER, FOR SOLUTION INTRAVENOUS at 11:44

## 2020-08-29 RX ADMIN — PANTOPRAZOLE SODIUM SCH MG: 40 INJECTION, POWDER, FOR SOLUTION INTRAVENOUS at 07:17

## 2020-08-29 RX ADMIN — KETOROLAC TROMETHAMINE SCH MG: 15 INJECTION, SOLUTION INTRAMUSCULAR; INTRAVENOUS at 11:45

## 2020-08-29 RX ADMIN — KETOROLAC TROMETHAMINE SCH MG: 15 INJECTION, SOLUTION INTRAMUSCULAR; INTRAVENOUS at 04:58

## 2020-08-29 RX ADMIN — KETOROLAC TROMETHAMINE SCH MG: 15 INJECTION, SOLUTION INTRAMUSCULAR; INTRAVENOUS at 23:16

## 2020-08-29 RX ADMIN — PIPERACILLIN AND TAZOBACTAM SCH MLS/HR: 3; .375 INJECTION, POWDER, FOR SOLUTION INTRAVENOUS at 02:08

## 2020-08-29 RX ADMIN — HEPARIN SODIUM SCH UNIT: 5000 INJECTION, SOLUTION INTRAVENOUS; SUBCUTANEOUS at 21:11

## 2020-08-29 NOTE — P.PN
Subjective


Progress Note Date: 08/29/20





This is a 74-year-old male with Dr.Tom Archuleta who denies any major 

significant medical history, has been dealing with constipation for years, and 

has not been to his primary care in over a couple years.  Currently does not 

take any medications.  Surgical history is positive for hernia repair in 2005.  

He presented to the emergency room with a 2 day history of right lower quadrant 

abdominal pain that is sharp and persistent in nature.  He believed it was his 

constipation after not improving decided to come to the emergency room.  He 

described acute onset of pain 10 out of 10.  Denies any shortness of breath or 

chest pain.  CT of the abdomen revealed dilated fluid-filled appendix, 

appendicitis with a tiny mole of free fluid in the pelvis and appendiceal 

rupture is suspected.  Patient was admitted to observation under Dr. Momin 

plan to go for surgery today.  EKG was done and normal sinus rhythm. CXR showed 

no acute process,  patient currently on Unasyn and Zosyn IV.  Blood cultures 

were ordered. Morphine IV ordered for pain control.  Vital signs are stable, 

temp was 103.1 now down to 99.5, pulse 89, blood pressure 126/84, pulse ox 95% 

on room air





 8/ 29 patient examined at bedside.  He does complain of lower abdomen 

distention and slight soreness.  Patient's urine output is reduced since patient

has been out of surgery yesterday.  Bladder scan was negative for urinary 

retention according to the nurse.  Vitals are stable with a temperature of 97.8 

blood pressure 101/59 oxygen saturation 96% on room air respiratory rate of 14. 

Leukocytosis has reduced from 16-10 hemoglobin is maintained at 12.  Patient was

found to have a gangrenous ruptured appendicitis with localized abscess on 

laparoscopic evaluation .  Abdominal wound irrigated with  500 mL of normal 

saline and localized abscess about 25 mL drained in the right lower quadrant.  

Patient to continue antibiotic on discharge.  Augmentin prescribed by surgery 

for 5 days.  If patient is able to urinate and able to tolerate regular diet 

patient can be discharged.








Review of Systems








General: Presentl fever, chills,nausea, or vomiting.


HEENT: No visual changes. No eye pain. No nasal symptoms. No dysphagia.No 

odynophagia. No ENT pain.


Cardiac: No chest pain. No palpitations. 


Pulmonary: No dyspnea.


GI: Positive right lower quadrant abdominal pain. No diarrhea.  Positive 

constipation. No bowel habit changes. No melena. No hematochezia. 


: No dysuria.No hematuria. No hesitancy. No urgency.  Decreasing urine output


Musculoskeletal: No musculoskeletal pain.


Integumentary: Denies rash. Denies pruritis.


Neurologic: Denies any lateralizing weakness. Denies headache. Denies numbness. 

Denies tingling. No seizure activity. Denies TIA or CVA.


Endocrine: Denies Fatigue 


Heme/Onc: Denies anemia. Denies cancer. Denies adenopathy.


Allergic/Immunologic: Denies allergies 





Objective





- Vital Signs


Vital signs: 


                                   Vital Signs











Temp  97.6 F   08/29/20 07:10


 


Pulse  69   08/29/20 07:40


 


Resp  17   08/29/20 07:40


 


BP  109/61   08/29/20 07:10


 


Pulse Ox  94 L  08/29/20 07:10








                                 Intake & Output











 08/28/20 08/29/20 08/29/20





 18:59 06:59 18:59


 


Intake Total 1050  


 


Output Total 32 300 50


 


Balance 1018 -300 -50


 


Weight 78.018 kg  


 


Intake:   


 


  IV 1050  


 


Output:   


 


  Urine 2 300 50


 


  Estimated Blood Loss 30  


 


Other:   


 


  # Voids 1 1 1














- Exam





General: Patient awake alert and oriented x 3.  No acute distress.


HEENT: Sclerae are clear. Pupils equal, round and reactive to light bilaterally.

 No cervical adenopathy.  No pharyngeal erythema or exudate. No thyromegaly.


Lymphatic: No anterior cervical adenopathy.


Chest: Heart regular in rate and rhythm positive S1 and S2.  No S3. No S4.  No 

clicks, rubs or murmurs.


Lungs: Clear to auscultation bilaterally.  No wheezes rales or rhonchi. 

Respirations even and nonlabored.


Abdomen/GI: Bowel sounds present in all 4 quadrants. Bowel sounds hypoactive.  

Positive tenderness right lower quadrant.  No mass.  No hepatomegaly or 

splenomegaly. No bruits. 


Musculoskeletal/ Extremities: No tenderness on muscular exam.  No ecchymosis.


Vascular: Radial pulses equal. 2/4.


Skin: No rash.


Neurologic: Awake, alert and oriented times 3. No lateralizing deficits noted on

gross inspection.


Psychiatric: Appropriate affect








- Labs


CBC & Chem 7: 


                                 08/29/20 06:45





                                 08/29/20 06:45


Labs: 


                  Abnormal Lab Results - Last 24 Hours (Table)











  08/29/20 08/29/20 Range/Units





  06:45 06:45 


 


RBC  4.16 L   (4.30-5.90)  m/uL


 


Hgb  12.1 L   (13.0-17.5)  gm/dL


 


Hct  37.8 L   (39.0-53.0)  %


 


Plt Count  140 L   (150-450)  k/uL


 


Neutrophils #  8.7 H   (1.3-7.7)  k/uL


 


Lymphocytes #  0.8 L   (1.0-4.8)  k/uL


 


Sodium   135 L  (137-145)  mmol/L


 


Chloride   109 H  ()  mmol/L


 


Carbon Dioxide   21 L  (22-30)  mmol/L


 


Calcium   6.7 L  (8.4-10.2)  mg/dL








                      Microbiology - Last 24 Hours (Table)











 08/28/20 09:59 Blood Culture - Preliminary





 Blood    No Growth after 24 hours


 


 08/28/20 09:59 Blood Culture - Preliminary





 Blood    No Growth after 24 hours


 


 08/28/20 18:22 Gram Stain - Preliminary





 Appendix Wound Culture - Preliminary


 


 08/28/20 18:22 Anaerobic Culture - Preliminary





 Appendix 














Assessment and Plan


Plan: 





1.  Acute Appendicitis with sepsis: Status post appendectomy with abscess 

drainage with Dr. Momin on 8/28.  Plan to be discharged on Augmentin for 5 

days


  Once patient tolerates diet and is able to urinate. 


2.  Leukocytosis with sepsis secondary to ruptured appendix : White blood cells 

over 16,000, likely related to ruptured appendicitis.   On osyn.  Blood cultures

 negative





3.  Right lower quadrant pain: Morphine ordered as needed





4.  Hyponatremia: IV fluids and place.





5.  DVT prophylaxis: on heparin subcu along with sequential compression devices 

and DARIO hose.





6.  GI prophylaxis: On Protonix 40 mg IV


 7 recurrent indirect inguinal hernia stable











Discharge plan: Likely discharge today once patient completed his IV bolus and 

tolerates a regular diet

## 2020-08-29 NOTE — P.PN
Subjective


Progress Note Date: 08/29/20

















CHIEF COMPLAINT: Ruptured appendicitis





HISTORY OF PRESENT ILLNESS: The patient is a 74-year-old male status post 

appendectomy, 08/28/20. He reports feeling much better. His creatinine has 

elevated. He reports lower bladder ache. He is passing flatus and tolerating 

diet. No reports of nausea or vomiting. Family is at bedside.





ROS: No reports of nausea and vomiting.  No bowel movements.  No fevers or 

chills.  No new chest pain.  No productive sputum





PHYSICAL EXAM: 


VITAL SIGNS: Reviewed


CONSTITUTIONAL:  Well developed and in no acute distress. 


EYES:  Conjuctivae without sclera icterus. Extraocular movements grossly intact.




HEAD, EARS, NOSE, THROAT: Moist buccal mucosa. Head is atraumatic, n

ormocephalic. Hears conversational speech. No nasal drainage.


NECK:  Supple. No thyroidomegaly.


RESPIRATORY:  Non-labored respirations and equal bilateral excursions.


CARDIOVASCULAR:  Palpable 2+ radial pulses.  


ABDOMEN: Lower abdominal fullness at bladder. Incisions are intact. No 

peritonitis.


: Urine output low, despite 3-L bolus yesterday.


MUSCULOSKELETAL:  No gross deformity of the lower extremities noted.  No 

clubbing.  No cyanosis.


SKIN: Good skin turgor. Well perfused. 


NEUROLOGIC: Cranial nerves II through XII grossly intact.  No focal or 

lateralizing signs. 


PSYCH:  Appropriate affect.  Alert and oriented to person, place and time. 





CLINICAL LABS: Creatinine up from 0.85 to 1.01. WBC normal 16.1 to 10.1





ASSESSMENT:


1. Ruptured appendicitis with sepsis





PLAN:


1. Herrera straight cath advised for clinical urinary retention. 


2. Outpatient antibiotics described


3. Flomax additional dose for urinary retention. 


4. Disposition pending resolution of urinary retention


5. He is using incentive spirometry increased volume from 1500 to 2000 mL today





ADDENDUM:


Straight cath shows 25 mL. Additional fluid bolus ordered. 























Objective





- Vital Signs


Vital signs: 


                                   Vital Signs











Temp  97.6 F   08/29/20 07:10


 


Pulse  69   08/29/20 07:40


 


Resp  17   08/29/20 07:40


 


BP  109/61   08/29/20 07:10


 


Pulse Ox  94 L  08/29/20 07:10








                                 Intake & Output











 08/28/20 08/29/20 08/29/20





 18:59 06:59 18:59


 


Intake Total 1050  


 


Output Total 32 300 480


 


Balance 1018 -300 -480


 


Weight 78.018 kg  


 


Intake:   


 


  IV 1050  


 


Output:   


 


  Urine 2 300 480


 


  Estimated Blood Loss 30  


 


Other:   


 


  # Voids 1 1 1














- Labs


CBC & Chem 7: 


                                 08/30/20 10:43





                                 08/30/20 10:43


Labs: 


                  Abnormal Lab Results - Last 24 Hours (Table)











  08/29/20 08/29/20 Range/Units





  06:45 06:45 


 


RBC  4.16 L   (4.30-5.90)  m/uL


 


Hgb  12.1 L   (13.0-17.5)  gm/dL


 


Hct  37.8 L   (39.0-53.0)  %


 


Plt Count  140 L   (150-450)  k/uL


 


Neutrophils #  8.7 H   (1.3-7.7)  k/uL


 


Lymphocytes #  0.8 L   (1.0-4.8)  k/uL


 


Sodium   135 L  (137-145)  mmol/L


 


Chloride   109 H  ()  mmol/L


 


Carbon Dioxide   21 L  (22-30)  mmol/L


 


Calcium   6.7 L  (8.4-10.2)  mg/dL








                      Microbiology - Last 24 Hours (Table)











 08/28/20 09:59 Blood Culture - Preliminary





 Blood    No Growth after 24 hours


 


 08/28/20 09:59 Blood Culture - Preliminary





 Blood    No Growth after 24 hours


 


 08/28/20 18:22 Gram Stain - Preliminary





 Appendix Wound Culture - Preliminary


 


 08/28/20 18:22 Anaerobic Culture - Preliminary





 Appendix 














Assessment and Plan


(1) COPD (chronic obstructive pulmonary disease)


Status: Acute   Code(s): J44.9 - CHRONIC OBSTRUCTIVE PULMONARY DISEASE, 

UNSPECIFIED   SNOMED Code(s): 80954463


   





(2) Sepsis


Status: Acute   Code(s): A41.9 - SEPSIS, UNSPECIFIED ORGANISM   SNOMED Code(s): 

63971283


   





(3) Acute appendicitis


Status: Acute   Code(s): K35.80 - UNSPECIFIED ACUTE APPENDICITIS   SNOMED 

Code(s): 52738357

## 2020-08-30 VITALS — RESPIRATION RATE: 18 BRPM

## 2020-08-30 VITALS — HEART RATE: 88 BPM | SYSTOLIC BLOOD PRESSURE: 156 MMHG | TEMPERATURE: 98.2 F | DIASTOLIC BLOOD PRESSURE: 84 MMHG

## 2020-08-30 LAB
ALBUMIN SERPL-MCNC: 3.3 G/DL (ref 3.5–5)
ALP SERPL-CCNC: 69 U/L (ref 38–126)
ALT SERPL-CCNC: 14 U/L (ref 4–49)
ANION GAP SERPL CALC-SCNC: 8 MMOL/L
AST SERPL-CCNC: 28 U/L (ref 17–59)
BASOPHILS # BLD AUTO: 0 K/UL (ref 0–0.2)
BASOPHILS NFR BLD AUTO: 0 %
BUN SERPL-SCNC: 13 MG/DL (ref 9–20)
CALCIUM SPEC-MCNC: 7.8 MG/DL (ref 8.4–10.2)
CHLORIDE SERPL-SCNC: 107 MMOL/L (ref 98–107)
CO2 SERPL-SCNC: 21 MMOL/L (ref 22–30)
EOSINOPHIL # BLD AUTO: 0.3 K/UL (ref 0–0.7)
EOSINOPHIL NFR BLD AUTO: 4 %
ERYTHROCYTE [DISTWIDTH] IN BLOOD BY AUTOMATED COUNT: 4.84 M/UL (ref 4.3–5.9)
ERYTHROCYTE [DISTWIDTH] IN BLOOD: 13.7 % (ref 11.5–15.5)
GLUCOSE SERPL-MCNC: 109 MG/DL (ref 74–99)
HCT VFR BLD AUTO: 43.3 % (ref 39–53)
HGB BLD-MCNC: 13.9 GM/DL (ref 13–17.5)
LYMPHOCYTES # SPEC AUTO: 0.6 K/UL (ref 1–4.8)
LYMPHOCYTES NFR SPEC AUTO: 8 %
MCH RBC QN AUTO: 28.8 PG (ref 25–35)
MCHC RBC AUTO-ENTMCNC: 32.2 G/DL (ref 31–37)
MCV RBC AUTO: 89.4 FL (ref 80–100)
MONOCYTES # BLD AUTO: 0.2 K/UL (ref 0–1)
MONOCYTES NFR BLD AUTO: 3 %
NEUTROPHILS # BLD AUTO: 6.8 K/UL (ref 1.3–7.7)
NEUTROPHILS NFR BLD AUTO: 85 %
PLATELET # BLD AUTO: 199 K/UL (ref 150–450)
POTASSIUM SERPL-SCNC: 3.7 MMOL/L (ref 3.5–5.1)
PROT SERPL-MCNC: 5.9 G/DL (ref 6.3–8.2)
SODIUM SERPL-SCNC: 136 MMOL/L (ref 137–145)
WBC # BLD AUTO: 8 K/UL (ref 3.8–10.6)

## 2020-08-30 RX ADMIN — TAMSULOSIN HYDROCHLORIDE SCH MG: 0.4 CAPSULE ORAL at 07:35

## 2020-08-30 RX ADMIN — ACETAMINOPHEN SCH: 500 TABLET ORAL at 11:21

## 2020-08-30 RX ADMIN — KETOROLAC TROMETHAMINE SCH MG: 15 INJECTION, SOLUTION INTRAMUSCULAR; INTRAVENOUS at 05:19

## 2020-08-30 RX ADMIN — PANTOPRAZOLE SODIUM SCH MG: 40 INJECTION, POWDER, FOR SOLUTION INTRAVENOUS at 07:36

## 2020-08-30 RX ADMIN — DIMETHICONE SCH MG: 80 TABLET, CHEWABLE ORAL at 11:19

## 2020-08-30 RX ADMIN — PIPERACILLIN AND TAZOBACTAM SCH MLS/HR: 3; .375 INJECTION, POWDER, FOR SOLUTION INTRAVENOUS at 11:21

## 2020-08-30 RX ADMIN — PIPERACILLIN AND TAZOBACTAM SCH MLS/HR: 3; .375 INJECTION, POWDER, FOR SOLUTION INTRAVENOUS at 02:36

## 2020-08-30 RX ADMIN — HEPARIN SODIUM SCH UNIT: 5000 INJECTION, SOLUTION INTRAVENOUS; SUBCUTANEOUS at 07:36

## 2020-08-30 RX ADMIN — ACETAMINOPHEN SCH MG: 500 TABLET ORAL at 05:19

## 2020-08-30 RX ADMIN — KETOROLAC TROMETHAMINE SCH MG: 15 INJECTION, SOLUTION INTRAMUSCULAR; INTRAVENOUS at 11:20

## 2020-08-30 RX ADMIN — DIMETHICONE SCH MG: 80 TABLET, CHEWABLE ORAL at 07:35

## 2020-08-30 NOTE — XR
EXAMINATION TYPE: XR abdomen 2V

 

DATE OF EXAM: 8/30/2020

 

COMPARISON: None

 

INDICATION: Abdominal distention

 

TECHNIQUE: Single view abdomen frontal upright view

 

FINDINGS:  

Multiple differential air-fluid levels are present within prominent small bowel loops. Small amount o
f air is within the colon. Clinical consideration for small bowel obstruction is recommended.

 

Psoas margins are normal.

No organomegaly is present.

Bilateral lung base pleural effusions are present.

 

IMPRESSION: 

1. Findings which can be compatible with partial small bowel obstruction or early complete small samy
l obstruction.

 

A [place] critical message alert has been initiated for Paula Finch MD via the [employer] 3
60 | Critical Results System on 8/30/2020 11:34 AM.  This message alert has been sent to Paula edwards MD via the preferences provided by the clinician for the receipt of Radiology Critical Finding
s. Message ID [id number].

## 2020-08-30 NOTE — P.DS
Providers


Date of admission: 


08/28/20 10:21





Expected date of discharge: 08/30/20


Attending physician: 


Paula Finch





Consults: 





                                        





08/28/20 08:27


Consult Physician Routine 


   Consulting Provider: Jose Ruby Reason/Comments: Medical management


   Do you want consulting provider notified?: Yes











Primary care physician: 


Gera Archuleta








- Discharge Diagnosis(es)


(1) COPD (chronic obstructive pulmonary disease)


Status: Acute   





(2) Sepsis


Status: Acute   





(3) Acute appendicitis


Status: Acute   


Hospital Course: 














CHIEF COMPLAINT: Ruptured appendicitis





HISTORY OF PRESENT ILLNESS: The patient is a 74-year-old male admitted for acute

appendicitis with rupture and sepsis.  He is status post appendectomy, 08/28/20.

 Yesterday he had urinary retention which is now resolved.  He is passing flatus

and having bowel movements. No nausea or vomiting.  He voided over 1400-mL. He 

reports some swelling along the pelvis and legs. He is ambulating. No new 

difficulties in breathing. He is anxious to get home. He does report feeling 

bloated but he is tolerating diet. 





ROS: No reports of nausea and vomiting.  No fevers or chills.  No new chest 

pain.  No productive sputum





PHYSICAL EXAM: 


VITAL SIGNS: Reviewed


CONSTITUTIONAL:  Well developed and in no acute distress. 


EYES:  Conjuctivae without sclera icterus. Extraocular movements grossly intact.




HEAD, EARS, NOSE, THROAT: Moist buccal mucosa. Head is atraumatic, 

normocephalic. Hears conversational speech. No nasal drainage.


NECK:  Supple. No thyroidomegaly.


RESPIRATORY:  Non-labored respirations and equal bilateral excursions.


CARDIOVASCULAR:  Palpable 2+ radial pulses.  


ABDOMEN: Mild distension. No peritonitis. 


MUSCULOSKELETAL:  No gross deformity of the lower extremities noted.  No 

clubbing.  No cyanosis.


SKIN: Good skin turgor. Well perfused. 


NEUROLOGIC: Cranial nerves II through XII grossly intact.  No focal or 

lateralizing signs. 


PSYCH:  Appropriate affect.  Alert and oriented to person, place and time. 





CLINICAL LABS: Creatinine up from 0.85 to 1.01, down 0.96. WBC normal 16.1 to 

10.1, now 8.0.





STUDIES: Abdominal xray independently reviewed. Features consistent with ileus.





RADIOLOGY: Report reviewed.





ASSESSMENT:


1. Ruptured appendicitis with sepsis





PLAN:


1. AXR reviewed including results with patient. He is having new bowel movements

and is passing flatus. 


2. Modified diet reviewed of liquids described. 


3. Discharge instructions reviewed including lifting restrictions. 


4. Stable for discharge with follow-up in office in 48hrs. 


5. Patient and family verbalized understanding of discharge instructions with 

home antibiotics and Flomax for home. 


Patient Condition at Discharge: Stable





Plan - Discharge Summary


Discharge Rx Participant: No


New Discharge Prescriptions: 


New


   Amoxic-Pot Clav 875-125Mg [Augmentin Xr 875-125] 1 each PO Q12HR #10 tablet


   Tamsulosin [Flomax] 0.4 mg PO DAILY #7 cap


   Ibuprofen [Motrin] 600 mg PO Q8HR PRN #30 tab


     PRN Reason: Pain


   Acetaminophen Tab [Tylenol Tab] 1,000 mg PO Q6HR PRN #30 tablet


     PRN Reason: Pain


Discharge Medication List





Acetaminophen Tab [Tylenol Tab] 1,000 mg PO Q6HR PRN #30 tablet 08/29/20 [Rx]


Amoxic-Pot Clav 875-125Mg [Augmentin Xr 875-125] 1 each PO Q12HR #10 tablet 

08/29/20 [Rx]


Ibuprofen [Motrin] 600 mg PO Q8HR PRN #30 tab 08/29/20 [Rx]


Tamsulosin [Flomax] 0.4 mg PO DAILY #7 cap 08/29/20 [Rx]








Follow up Appointment(s)/Referral(s): 


Paula Finch MD [STAFF PHYSICIAN] - 09/01/20


Gera Archuleta MD [Primary Care Provider] - 1-2 days


Patient Instructions/Handouts:  How to Use an Incentive Spirometer (DC), 

Appendicitis (GEN), Full Liquid Diet (DC), Laparoscopic Appendectomy (DC)


Activity/Diet/Wound Care/Special Instructions: 


advance diet as tolerated, recommend yogurt, low fibre diet, education material 

on discharge 


Discharge Disposition: HOME SELF-CARE

## 2020-08-30 NOTE — P.PN
Subjective


Progress Note Date: 08/30/20





This is a 74-year-old male with Dr.Tom Archuleta who denies any major 

significant medical history, has been dealing with constipation for years, and 

has not been to his primary care in over a couple years.  Currently does not 

take any medications.  Surgical history is positive for hernia repair in 2005.  

He presented to the emergency room with a 2 day history of right lower quadrant 

abdominal pain that is sharp and persistent in nature.  He believed it was his 

constipation after not improving decided to come to the emergency room.  He 

described acute onset of pain 10 out of 10.  Denies any shortness of breath or 

chest pain.  CT of the abdomen revealed dilated fluid-filled appendix, 

appendicitis with a tiny mole of free fluid in the pelvis and appendiceal 

rupture is suspected.  Patient was admitted to observation under Dr. Momin 

plan to go for surgery today.  EKG was done and normal sinus rhythm. CXR showed 

no acute process,  patient currently on Unasyn and Zosyn IV.  Blood cultures 

were ordered. Morphine IV ordered for pain control.  Vital signs are stable, 

temp was 103.1 now down to 99.5, pulse 89, blood pressure 126/84, pulse ox 95% 

on room air





 8/ 29 patient examined at bedside.  He does complain of lower abdomen 

distention and slight soreness.  Patient's urine output is reduced since patient

has been out of surgery yesterday.  Bladder scan was negative for urinary 

retention according to the nurse.  Vitals are stable with a temperature of 97.8 

blood pressure 101/59 oxygen saturation 96% on room air respiratory rate of 14. 

Leukocytosis has reduced from 16-10 hemoglobin is maintained at 12.  Patient was

found to have a gangrenous ruptured appendicitis with localized abscess on 

laparoscopic evaluation .  Abdominal wound irrigated with  500 mL of normal 

saline and localized abscess about 25 mL drained in the right lower quadrant.  

Patient to continue antibiotic on discharge.  Augmentin prescribed by surgery 

for 5 days.  If patient is able to urinate and able to tolerate regular diet 

patient can be discharged.








8/30 patient examined bedside.  Denies any shortness of breath or chest pain.  

Patient did have an increased soreness in the lower abdomen for which abdominal 

x-ray was finally obtained that suggested partial bowel obstruction.  Did have a

bowel movement the abdominal x-ray as well as passing flatness.  Patient was 

able to tolerate clear liquid diet this morning.  Patient instructed to advance 

diet slowly and to seek medical attention is started having symptoms of nausea 

and vomiting increased abdominal pain or fever or chills or any change in deloris

ent's condition.  Patient is currently medically stable to be discharged home





Review of Systems








General: Presentl fever, chills,nausea, or vomiting.


HEENT: No visual changes. No eye pain. No nasal symptoms. No dysphagia.No 

odynophagia. No ENT pain.


Cardiac: No chest pain. No palpitations. 


Pulmonary: No dyspnea.


GI: Positive right lower quadrant abdominal pain improved since yesterday. No 

diarrhea.  Positive constipation. No bowel habit changes. No melena. No 

hematochezia. 


: No dysuria.No hematuria. No hesitancy. No urgency.  Decreasing urine output


Musculoskeletal: No musculoskeletal pain.


Integumentary: Denies rash. Denies pruritis.


Neurologic: Denies any lateralizing weakness. Denies headache. Denies numbness. 

Denies tingling. No seizure activity. Denies TIA or CVA.


Endocrine: Denies Fatigue 


Heme/Onc: Denies anemia. Denies cancer. Denies adenopathy.


Allergic/Immunologic: Denies allergies 





Objective





- Vital Signs


Vital signs: 


                                   Vital Signs











Temp  98.2 F   08/30/20 07:00


 


Pulse  88   08/30/20 07:00


 


Resp  18   08/30/20 07:30


 


BP  156/84   08/30/20 07:00


 


Pulse Ox  95   08/30/20 07:00








                                 Intake & Output











 08/29/20 08/30/20 08/30/20





 18:59 06:59 18:59


 


Output Total 880 1425 


 


Balance -880 -1425 


 


Output:   


 


  Urine 880 1425 


 


Other:   


 


  Voiding Method  Toilet Toilet





  Urinal Urinal


 


  # Voids 1 2 


 


  # Bowel Movements   1














- Exam





General: Patient awake alert and oriented x 3.  No acute distress.


HEENT: Sclerae are clear. Pupils equal, round and reactive to light bilaterally.

 No cervical adenopathy.  No pharyngeal erythema or exudate. No thyromegaly.


Lymphatic: No anterior cervical adenopathy.


Chest: Heart regular in rate and rhythm positive S1 and S2.  No S3. No S4.  No 

clicks, rubs or murmurs.


Lungs: Clear to auscultation bilaterally.  No wheezes rales or rhonchi. 

Respirations even and nonlabored.


Abdomen/GI: Bowel sounds present in all 4 quadrants. Bowel sounds hypoactive.  

Tenderness improved No mass.  No hepatomegaly or splenomegaly. No bruits. 


Musculoskeletal/ Extremities: No tenderness on muscular exam.  No ecchymosis.


Vascular: Radial pulses equal. 2/4.


Skin: No rash.


Neurologic: Awake, alert and oriented times 3. No lateralizing deficits noted on

gross inspection.


Psychiatric: Appropriate affect








- Labs


CBC & Chem 7: 


                                 08/30/20 10:43





                                 08/30/20 10:43


Labs: 


                  Abnormal Lab Results - Last 24 Hours (Table)











  08/30/20 08/30/20 Range/Units





  10:43 10:43 


 


Lymphocytes #  0.6 L   (1.0-4.8)  k/uL


 


Sodium   136 L  (137-145)  mmol/L


 


Carbon Dioxide   21 L  (22-30)  mmol/L


 


Glucose   109 H  (74-99)  mg/dL


 


Calcium   7.8 L  (8.4-10.2)  mg/dL


 


Total Protein   5.9 L  (6.3-8.2)  g/dL


 


Albumin   3.3 L  (3.5-5.0)  g/dL








                      Microbiology - Last 24 Hours (Table)











 08/28/20 09:59 Blood Culture - Preliminary





 Blood    No Growth after 48 hours


 


 08/28/20 09:59 Blood Culture - Preliminary





 Blood    No Growth after 48 hours


 


 08/28/20 18:22 Gram Stain - Preliminary





 Appendix Wound Culture - Preliminary





    Gram Neg Bacilli














Assessment and Plan


Plan: 





1.  Acute Appendicitis with sepsis: Status post appendectomy with abscess 

drainage with Dr. Momin on 8/28.  Augmentin on discharge


  


2.  Leukocytosis with sepsis secondary to ruptured appendix : White blood cells 

over 16,000, likely related to ruptured appendicitis.   On osyn.  Blood cultures

 negative





3.  She'll small bowel obstruction likely secondary to ileus Resolved Morphine 

ordered as needed





4.  Hyponatremia: Resolved





5.  DVT prophylaxis: on heparin subcu along with sequential compression devices 

and DARIO hose.





6.  GI prophylaxis: On Protonix 40 mg IV


7 recurrent indirect inguinal hernia stable











Discharge plan: Likely discharge today

## 2020-08-31 NOTE — CDI
Documentation Clarification Form

Date: 8/31/2020

From: Mc Middleton

Phone: If you have a question about this query, please contact Alberta Andrews 
 at [phone] between 8am and 5pm.

Admit Date: 8/28/2020

Discharge Date: 8/31/2020

Patient Name: Dax Ortiz

Visit Number: [id number]



ATTENTION: The Clinical Documentation Specialists (CDI) and [facility] Coding Staff 
appreciate your assistance in clarifying documentation. Please respond to the 
clarification below the line at the bottom and electronically sign. The CDI & 
[facility] Coding staff will review the response and follow-up if needed. Please note: 
Queries are made part of the Legal Health Record. If you have any questions, 
please contact the author of this message via ITS.



Dear Paula Ma MD.,



Ileus is documented in the 8/30 progress note "small bowel obstruction likely 
secondary to ileus".

Patients Admitting Diagnosis:Appendicitis

Post-Operative Diagnosis: Ruptured acute appendicitis with sepsis, localized 
peritonitis, abscess

Procedure performed: Robotic-assisted daVinci Xi laparoscopic appendectomy 

History/Risk Factors: Appendicitis, sepsis



Abdominal x-ray independently reviewed.Features consistent with ileus.



In order to accurately reflect this patients severity of illness, please 
clarify if the Ileus is___



-is a complication of surgical procedure

-is an expected outcome of the surgical procedure

-is related to co-morbid condition(s) of 

-Other  please specify

-Unable to determine

___________________________________________________________________________



-is related to co-morbid condition(s) of ruptured appendicitis with sepsis



 8/31/20@ 16:57

MTDD

## 2021-08-17 ENCOUNTER — HOSPITAL ENCOUNTER (EMERGENCY)
Dept: HOSPITAL 47 - EC | Age: 76
Discharge: HOME | End: 2021-08-17
Payer: MEDICARE

## 2021-08-17 VITALS — TEMPERATURE: 97.1 F | DIASTOLIC BLOOD PRESSURE: 85 MMHG | HEART RATE: 60 BPM | SYSTOLIC BLOOD PRESSURE: 148 MMHG

## 2021-08-17 VITALS — RESPIRATION RATE: 18 BRPM

## 2021-08-17 DIAGNOSIS — Z87.891: ICD-10-CM

## 2021-08-17 DIAGNOSIS — U07.1: Primary | ICD-10-CM

## 2021-08-17 DIAGNOSIS — J44.9: ICD-10-CM

## 2021-08-17 PROCEDURE — 96365 THER/PROPH/DIAG IV INF INIT: CPT

## 2021-08-17 PROCEDURE — 87635 SARS-COV-2 COVID-19 AMP PRB: CPT

## 2021-08-17 PROCEDURE — 99283 EMERGENCY DEPT VISIT LOW MDM: CPT

## 2021-08-17 NOTE — ED
General Adult HPI





- General


Chief complaint: ENT


Stated complaint: Sore Throat


Time Seen by Provider: 21 10:18


Source: patient, RN notes reviewed


Mode of arrival: ambulatory


Limitations: no limitations





- History of Present Illness


Initial comments: 





75-year-old male presents emergency Department chief complaint of possible 

COVID-19.  Patient states that his wife was cause one week ago.  He has a mild 

sore throat though he states is not out of the usual.  No coffee-ground it chest

pain shortness breath cough or GI symptoms.  He does state that he has COPD.  No

reported fevers.





- Related Data


                                  Previous Rx's











 Medication  Instructions  Recorded


 


Acetaminophen Tab [Tylenol Tab] 1,000 mg PO Q6HR PRN #30 tablet 20


 


Amoxic-Pot Clav 875-125Mg 1 each PO Q12HR #10 tablet 20





[Augmentin Xr 875-125]  


 


Ibuprofen [Motrin] 600 mg PO Q8HR PRN #30 tab 20


 


Tamsulosin [Flomax] 0.4 mg PO DAILY #7 cap 20











                                    Allergies











Allergy/AdvReac Type Severity Reaction Status Date / Time


 


prednisone Allergy  Unknown Verified 21 10:14














Review of Systems


ROS Statement: 


Those systems with pertinent positive or pertinent negative responses have been 

documented in the HPI.





ROS Other: All systems not noted in ROS Statement are negative.





Past Medical History


Past Medical History: COPD


Additional Past Medical History / Comment(s): Constipation


History of Any Multi-Drug Resistant Organisms: None Reported


Past Surgical History: Hernia Repair


Past Psychological History: No Psychological Hx Reported


Smoking Status: Former smoker


Past Alcohol Use History: Occasional


Past Drug Use History: None Reported





- Past Family History


  ** Mother


History Unknown: Yes


Family Medical History: Cancer (Gallbladder cancer  at 72)





  ** Father


History Unknown: Yes (He ceased from plane accident at age 67)


Family Medical History: Hypertension





  ** Brother(s)


Family Medical History: No Reported History (One brother alive and well other 

brother  in MVA)





General Exam


Limitations: no limitations


General appearance: alert, in no apparent distress


Head exam: Present: atraumatic, normocephalic, normal inspection


Eye exam: Present: normal appearance, PERRL, EOMI.  Absent: scleral icterus, 

conjunctival injection, periorbital swelling


ENT exam: Present: normal exam, normal oropharynx


Neck exam: Present: normal inspection, full ROM.  Absent: tenderness, 

meningismus, lymphadenopathy


Respiratory exam: Present: normal lung sounds bilaterally.  Absent: respiratory 

distress, wheezes, rales, rhonchi, stridor


Cardiovascular Exam: Present: regular rate, normal rhythm, normal heart sounds. 

 Absent: systolic murmur, diastolic murmur, rubs, gallop, clicks





Course


                                   Vital Signs











  21





  10:12


 


Temperature 97.7 F


 


Pulse Rate 73


 


Respiratory 18





Rate 


 


Blood Pressure 161/86


 


O2 Sat by Pulse 98





Oximetry 














Medical Decision Making





- Medical Decision Making





Patient's vitals are stable.  Patient is positive for COVID-19.  Patient did 

receive monoclonal antibodies will be discharged in stable condition.





- Lab Data


                                   Lab Results











  21 Range/Units





  10:34 


 


Coronavirus (PCR)  Detected A  (Not Detectd)  














Disposition


Clinical Impression: 


 COVID-19





Disposition: HOME SELF-CARE


Condition: Stable


Instructions (If sedation given, give patient instructions):  Coronavirus 

Disease 2019 (COVID-19)


Additional Instructions: 


Please return to the Emergency Department if symptoms worsen or any other 

concerns.


Is patient prescribed a controlled substance at d/c from ED?: No


Referrals: 


Gera Archuleta MD [Primary Care Provider] - 1-2 days


Time of Disposition: 12:01